# Patient Record
Sex: MALE | Race: OTHER | Employment: STUDENT | URBAN - METROPOLITAN AREA
[De-identification: names, ages, dates, MRNs, and addresses within clinical notes are randomized per-mention and may not be internally consistent; named-entity substitution may affect disease eponyms.]

---
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
## 2022-09-21 ENCOUNTER — OFFICE VISIT (OUTPATIENT)
Dept: FAMILY MEDICINE CLINIC | Facility: CLINIC | Age: 22
End: 2022-09-21

## 2022-09-21 VITALS
BODY MASS INDEX: 22.62 KG/M2 | SYSTOLIC BLOOD PRESSURE: 129 MMHG | HEIGHT: 70 IN | WEIGHT: 158 LBS | RESPIRATION RATE: 18 BRPM | TEMPERATURE: 98 F | HEART RATE: 76 BPM | DIASTOLIC BLOOD PRESSURE: 70 MMHG | OXYGEN SATURATION: 99 %

## 2022-09-21 DIAGNOSIS — K64.9 HEMORRHOIDS, UNSPECIFIED HEMORRHOID TYPE: Primary | ICD-10-CM

## 2022-09-21 PROCEDURE — 3074F SYST BP LT 130 MM HG: CPT | Performed by: NURSE PRACTITIONER

## 2022-09-21 PROCEDURE — 3078F DIAST BP <80 MM HG: CPT | Performed by: NURSE PRACTITIONER

## 2022-09-21 PROCEDURE — 99202 OFFICE O/P NEW SF 15 MIN: CPT | Performed by: NURSE PRACTITIONER

## 2022-09-21 PROCEDURE — 3008F BODY MASS INDEX DOCD: CPT | Performed by: NURSE PRACTITIONER

## 2023-10-11 ENCOUNTER — OFFICE VISIT (OUTPATIENT)
Dept: FAMILY MEDICINE CLINIC | Facility: CLINIC | Age: 23
End: 2023-10-11
Payer: MEDICAID

## 2023-10-11 VITALS
TEMPERATURE: 98 F | SYSTOLIC BLOOD PRESSURE: 118 MMHG | HEIGHT: 70 IN | RESPIRATION RATE: 16 BRPM | HEART RATE: 80 BPM | OXYGEN SATURATION: 96 % | BODY MASS INDEX: 24.05 KG/M2 | WEIGHT: 168 LBS | DIASTOLIC BLOOD PRESSURE: 60 MMHG

## 2023-10-11 DIAGNOSIS — R10.10 UPPER ABDOMINAL PAIN: Primary | ICD-10-CM

## 2023-10-11 PROCEDURE — 99214 OFFICE O/P EST MOD 30 MIN: CPT | Performed by: NURSE PRACTITIONER

## 2023-10-11 PROCEDURE — 3078F DIAST BP <80 MM HG: CPT | Performed by: NURSE PRACTITIONER

## 2023-10-11 PROCEDURE — 3008F BODY MASS INDEX DOCD: CPT | Performed by: NURSE PRACTITIONER

## 2023-10-11 PROCEDURE — 3074F SYST BP LT 130 MM HG: CPT | Performed by: NURSE PRACTITIONER

## 2023-10-12 ENCOUNTER — OFFICE VISIT (OUTPATIENT)
Dept: INTERNAL MEDICINE CLINIC | Facility: CLINIC | Age: 23
End: 2023-10-12

## 2023-10-12 ENCOUNTER — LAB ENCOUNTER (OUTPATIENT)
Dept: LAB | Age: 23
End: 2023-10-12
Attending: NURSE PRACTITIONER
Payer: MEDICAID

## 2023-10-12 VITALS
SYSTOLIC BLOOD PRESSURE: 120 MMHG | WEIGHT: 166 LBS | HEIGHT: 70 IN | BODY MASS INDEX: 23.77 KG/M2 | TEMPERATURE: 98 F | HEART RATE: 95 BPM | DIASTOLIC BLOOD PRESSURE: 60 MMHG | OXYGEN SATURATION: 98 %

## 2023-10-12 DIAGNOSIS — R06.02 SHORTNESS OF BREATH: ICD-10-CM

## 2023-10-12 DIAGNOSIS — K64.0 GRADE I HEMORRHOIDS: ICD-10-CM

## 2023-10-12 DIAGNOSIS — K59.1 FUNCTIONAL DIARRHEA: ICD-10-CM

## 2023-10-12 DIAGNOSIS — R53.82 CHRONIC FATIGUE: ICD-10-CM

## 2023-10-12 DIAGNOSIS — R10.10 PAIN OF UPPER ABDOMEN: ICD-10-CM

## 2023-10-12 DIAGNOSIS — R10.10 PAIN OF UPPER ABDOMEN: Primary | ICD-10-CM

## 2023-10-12 DIAGNOSIS — R10.84 GENERALIZED ABDOMINAL PAIN: ICD-10-CM

## 2023-10-12 PROBLEM — K64.9 HEMORRHOIDS: Status: ACTIVE | Noted: 2023-10-12

## 2023-10-12 LAB
ALBUMIN SERPL-MCNC: 4.5 G/DL (ref 3.4–5)
ALBUMIN/GLOB SERPL: 1.5 {RATIO} (ref 1–2)
ALP LIVER SERPL-CCNC: 80 U/L
ALT SERPL-CCNC: 53 U/L
ANION GAP SERPL CALC-SCNC: 7 MMOL/L (ref 0–18)
AST SERPL-CCNC: 23 U/L (ref 15–37)
BILIRUB SERPL-MCNC: 0.9 MG/DL (ref 0.1–2)
BUN BLD-MCNC: 8 MG/DL (ref 7–18)
BUN/CREAT SERPL: 7.1 (ref 10–20)
CALCIUM BLD-MCNC: 9.7 MG/DL (ref 8.5–10.1)
CHLORIDE SERPL-SCNC: 106 MMOL/L (ref 98–112)
CHOLEST SERPL-MCNC: 147 MG/DL (ref ?–200)
CO2 SERPL-SCNC: 27 MMOL/L (ref 21–32)
CREAT BLD-MCNC: 1.12 MG/DL
DEPRECATED RDW RBC AUTO: 37.8 FL (ref 35.1–46.3)
EGFRCR SERPLBLD CKD-EPI 2021: 96 ML/MIN/1.73M2 (ref 60–?)
ERYTHROCYTE [DISTWIDTH] IN BLOOD BY AUTOMATED COUNT: 11.9 % (ref 11–15)
FASTING PATIENT LIPID ANSWER: YES
FASTING STATUS PATIENT QL REPORTED: YES
GLOBULIN PLAS-MCNC: 3.1 G/DL (ref 2.8–4.4)
GLUCOSE BLD-MCNC: 123 MG/DL (ref 70–99)
HCT VFR BLD AUTO: 48.8 %
HDLC SERPL-MCNC: 60 MG/DL (ref 40–59)
HGB BLD-MCNC: 16.6 G/DL
LDLC SERPL CALC-MCNC: 77 MG/DL (ref ?–100)
MCH RBC QN AUTO: 29.3 PG (ref 26–34)
MCHC RBC AUTO-ENTMCNC: 34 G/DL (ref 31–37)
MCV RBC AUTO: 86.1 FL
NONHDLC SERPL-MCNC: 87 MG/DL (ref ?–130)
OSMOLALITY SERPL CALC.SUM OF ELEC: 290 MOSM/KG (ref 275–295)
PLATELET # BLD AUTO: 232 10(3)UL (ref 150–450)
POTASSIUM SERPL-SCNC: 3.8 MMOL/L (ref 3.5–5.1)
PROT SERPL-MCNC: 7.6 G/DL (ref 6.4–8.2)
RBC # BLD AUTO: 5.67 X10(6)UL
SODIUM SERPL-SCNC: 140 MMOL/L (ref 136–145)
TRIGL SERPL-MCNC: 46 MG/DL (ref 30–149)
TSI SER-ACNC: 0.8 MIU/ML (ref 0.36–3.74)
VLDLC SERPL CALC-MCNC: 7 MG/DL (ref 0–30)
WBC # BLD AUTO: 5.2 X10(3) UL (ref 4–11)

## 2023-10-12 PROCEDURE — 3008F BODY MASS INDEX DOCD: CPT | Performed by: NURSE PRACTITIONER

## 2023-10-12 PROCEDURE — 84443 ASSAY THYROID STIM HORMONE: CPT

## 2023-10-12 PROCEDURE — 3078F DIAST BP <80 MM HG: CPT | Performed by: NURSE PRACTITIONER

## 2023-10-12 PROCEDURE — 3074F SYST BP LT 130 MM HG: CPT | Performed by: NURSE PRACTITIONER

## 2023-10-12 PROCEDURE — 87338 HPYLORI STOOL AG IA: CPT

## 2023-10-12 PROCEDURE — 81003 URINALYSIS AUTO W/O SCOPE: CPT

## 2023-10-12 PROCEDURE — 85027 COMPLETE CBC AUTOMATED: CPT

## 2023-10-12 PROCEDURE — 99205 OFFICE O/P NEW HI 60 MIN: CPT | Performed by: NURSE PRACTITIONER

## 2023-10-12 PROCEDURE — 80061 LIPID PANEL: CPT

## 2023-10-12 PROCEDURE — 80053 COMPREHEN METABOLIC PANEL: CPT

## 2023-10-12 PROCEDURE — 36415 COLL VENOUS BLD VENIPUNCTURE: CPT

## 2023-10-12 NOTE — ASSESSMENT & PLAN NOTE
Patient complains of shortness of breath at times. No shortness of breath this visit. No edema. Lungs are clear.     Plan  Monitor  Annual labs  Encouraged to seek employment

## 2023-10-12 NOTE — ASSESSMENT & PLAN NOTE
Patient with generalized abdominal pain. Unremarkable abdominal exam.  Patient denies any pain with palpation. Bowel sounds in all 4 quadrants. Patient states the pain occurs with movement which would lead me to believe it is more muscle skeletal pain. At times he says he has a burning pain in his epigastric area. Plan  Stool for H. Pylori. Following a balanced diet.

## 2023-10-12 NOTE — ASSESSMENT & PLAN NOTE
Patient alternates between constipation and diarrhea. Plan  Discussed lifestyle modifications including reductions in dietary total and saturated fat, weight loss, aerobic exercise, and eating a diet rich in fruits and vegetables. Reduce bread, pasta and rice in your diet.     Having a cup of coffee a day- No cream or sugar

## 2023-10-13 ENCOUNTER — LAB ENCOUNTER (OUTPATIENT)
Dept: LAB | Age: 23
End: 2023-10-13
Attending: NURSE PRACTITIONER
Payer: MEDICAID

## 2023-10-13 DIAGNOSIS — R10.10 PAIN OF UPPER ABDOMEN: ICD-10-CM

## 2023-10-13 LAB
BILIRUB UR QL: NEGATIVE
CLARITY UR: CLEAR
COLOR UR: COLORLESS
GLUCOSE UR-MCNC: NORMAL MG/DL
HGB UR QL STRIP.AUTO: NEGATIVE
KETONES UR-MCNC: NEGATIVE MG/DL
LEUKOCYTE ESTERASE UR QL STRIP.AUTO: NEGATIVE
NITRITE UR QL STRIP.AUTO: NEGATIVE
PH UR: 5 [PH] (ref 5–8)
PROT UR-MCNC: NEGATIVE MG/DL
SP GR UR STRIP: <1.005 (ref 1–1.03)
UROBILINOGEN UR STRIP-ACNC: NORMAL

## 2023-10-16 ENCOUNTER — TELEPHONE (OUTPATIENT)
Dept: INTERNAL MEDICINE CLINIC | Facility: CLINIC | Age: 23
End: 2023-10-16

## 2023-10-16 LAB — H PYLORI AG STL QL IA: NEGATIVE

## 2023-10-16 NOTE — TELEPHONE ENCOUNTER
Spoke to patient (name and  of patient verified). He states he is returning call about test results. Provider's results and recommendations reviewed with patient, patient verbalizes understanding. Patient reports he had sharp abdominal pain this afternoon that lasted less that 1 minute. He ate some beans and meat from [employer] about 2-3 hours beforehand and states the food was not spicy. Patient states he is anxious about what may be causing his symptoms. He requests appointment to discuss concerns with provider. Appointment scheduled:  Future Appointments   Date Time Provider Ayse Cardenas   10/17/2023  3:40 PM ZEINA [facility]   2023  4:00 PM ZEINA De Santiago [facility]     Result note from H. Pylori test collected 10/12/23:   Charlie Adorno MD  10/16/2023  4:03 PM CDT Back to Top      Please call patient with results. Charlie Adorno MD  10/16/2023  2:84 PM CDT       Helicobacter is negative , this is the bacteria which causes heartburn.

## 2023-10-30 ENCOUNTER — OFFICE VISIT (OUTPATIENT)
Dept: INTERNAL MEDICINE CLINIC | Facility: CLINIC | Age: 23
End: 2023-10-30

## 2023-10-30 VITALS
BODY MASS INDEX: 23.34 KG/M2 | SYSTOLIC BLOOD PRESSURE: 115 MMHG | HEIGHT: 70 IN | HEART RATE: 92 BPM | DIASTOLIC BLOOD PRESSURE: 60 MMHG | WEIGHT: 163 LBS

## 2023-10-30 DIAGNOSIS — K59.1 FUNCTIONAL DIARRHEA: ICD-10-CM

## 2023-10-30 DIAGNOSIS — R73.9 HYPERGLYCEMIA: Primary | ICD-10-CM

## 2023-10-30 DIAGNOSIS — R53.82 CHRONIC FATIGUE: ICD-10-CM

## 2023-10-30 DIAGNOSIS — R10.84 GENERALIZED ABDOMINAL PAIN: ICD-10-CM

## 2023-10-30 DIAGNOSIS — R06.02 SHORTNESS OF BREATH: ICD-10-CM

## 2023-10-30 LAB
CARTRIDGE LOT#: 612 NUMERIC
HEMOGLOBIN A1C: 4.9 % (ref 4.3–5.6)

## 2023-10-30 PROCEDURE — 83036 HEMOGLOBIN GLYCOSYLATED A1C: CPT | Performed by: NURSE PRACTITIONER

## 2023-10-30 PROCEDURE — 3078F DIAST BP <80 MM HG: CPT | Performed by: NURSE PRACTITIONER

## 2023-10-30 PROCEDURE — 99214 OFFICE O/P EST MOD 30 MIN: CPT | Performed by: NURSE PRACTITIONER

## 2023-10-30 PROCEDURE — 3008F BODY MASS INDEX DOCD: CPT | Performed by: NURSE PRACTITIONER

## 2023-10-30 PROCEDURE — 3074F SYST BP LT 130 MM HG: CPT | Performed by: NURSE PRACTITIONER

## 2023-10-30 NOTE — ASSESSMENT & PLAN NOTE
Patient states he is SOB at times. No dyspnea noted.   Lungs clear    Plan  Patient assured that all his labs are WNL

## 2023-10-30 NOTE — ASSESSMENT & PLAN NOTE
Fatigue - Most likely from the magnesium he has been taking for sleep. Plan  Stop this supplement of magnesium.   Follow up with [facility]

## 2023-11-07 PROBLEM — R10.84 GENERALIZED ABDOMINAL PAIN: Status: RESOLVED | Noted: 2023-10-12 | Resolved: 2023-11-07

## 2023-11-07 PROBLEM — R06.02 SHORTNESS OF BREATH: Status: RESOLVED | Noted: 2023-10-12 | Resolved: 2023-11-07

## 2023-11-07 PROBLEM — F41.1 GENERALIZED ANXIETY DISORDER: Status: ACTIVE | Noted: 2023-11-07

## 2023-11-07 PROBLEM — F32.0 CURRENT MILD EPISODE OF MAJOR DEPRESSIVE DISORDER (HCC): Status: ACTIVE | Noted: 2023-11-07

## 2023-11-07 PROBLEM — R53.82 CHRONIC FATIGUE: Status: RESOLVED | Noted: 2023-10-12 | Resolved: 2023-11-07

## 2023-11-07 PROBLEM — F32.0 CURRENT MILD EPISODE OF MAJOR DEPRESSIVE DISORDER: Status: ACTIVE | Noted: 2023-11-07

## 2023-11-07 PROBLEM — R19.5 LOOSE STOOLS: Status: ACTIVE | Noted: 2023-10-12

## 2023-11-07 PROBLEM — R25.3 MUSCLE TWITCHING: Status: ACTIVE | Noted: 2023-11-07

## 2023-11-07 PROBLEM — G44.89 OTHER HEADACHE SYNDROME: Status: ACTIVE | Noted: 2023-11-07

## 2024-02-09 ENCOUNTER — TELEPHONE (OUTPATIENT)
Dept: NEUROLOGY | Facility: CLINIC | Age: 24
End: 2024-02-09

## 2024-02-09 NOTE — TELEPHONE ENCOUNTER
FABIENNE. Pt had appt on March 28, provider won't be in office. If pt calls back please assist in r/s.

## 2024-11-08 ENCOUNTER — TELEPHONE (OUTPATIENT)
Dept: INTERNAL MEDICINE CLINIC | Facility: CLINIC | Age: 24
End: 2024-11-08

## 2024-11-08 NOTE — TELEPHONE ENCOUNTER
Patient booked via [employer]        Message    Appointment for: Tong Quan ([id number])   Visit type: MYCHART EXAM (2964)   11/11/2024 3:40 PM (20 minutes) with Netta Dorado in [facility]-INTERNAL MED      Patient comments:   problems breathing and phlegm

## 2024-11-12 ENCOUNTER — PATIENT MESSAGE (OUTPATIENT)
Dept: ADMINISTRATIVE | Age: 24
End: 2024-11-12

## 2024-11-13 ENCOUNTER — HOSPITAL ENCOUNTER (EMERGENCY)
Facility: HOSPITAL | Age: 24
Discharge: HOME | End: 2024-11-13

## 2024-11-13 ENCOUNTER — APPOINTMENT (OUTPATIENT)
Dept: GENERAL RADIOLOGY | Facility: HOSPITAL | Age: 24
End: 2024-11-13
Attending: NURSE PRACTITIONER

## 2024-11-13 VITALS
RESPIRATION RATE: 16 BRPM | DIASTOLIC BLOOD PRESSURE: 82 MMHG | TEMPERATURE: 98 F | BODY MASS INDEX: 27 KG/M2 | OXYGEN SATURATION: 98 % | SYSTOLIC BLOOD PRESSURE: 118 MMHG | HEART RATE: 67 BPM | WEIGHT: 190 LBS

## 2024-11-13 DIAGNOSIS — R07.9 ACUTE CHEST PAIN: Primary | ICD-10-CM

## 2024-11-13 LAB
ANION GAP SERPL CALC-SCNC: 6 MMOL/L (ref 0–18)
ATRIAL RATE: 87 BPM
BASOPHILS # BLD AUTO: 0.05 X10(3) UL (ref 0–0.2)
BASOPHILS NFR BLD AUTO: 0.7 %
BUN BLD-MCNC: 9 MG/DL (ref 9–23)
BUN/CREAT SERPL: 8.4 (ref 10–20)
CALCIUM BLD-MCNC: 10.8 MG/DL (ref 8.7–10.4)
CHLORIDE SERPL-SCNC: 106 MMOL/L (ref 98–112)
CO2 SERPL-SCNC: 30 MMOL/L (ref 21–32)
CREAT BLD-MCNC: 1.07 MG/DL
DEPRECATED RDW RBC AUTO: 35.8 FL (ref 35.1–46.3)
EGFRCR SERPLBLD CKD-EPI 2021: 101 ML/MIN/1.73M2 (ref 60–?)
EOSINOPHIL # BLD AUTO: 0.08 X10(3) UL (ref 0–0.7)
EOSINOPHIL NFR BLD AUTO: 1.1 %
ERYTHROCYTE [DISTWIDTH] IN BLOOD BY AUTOMATED COUNT: 11.9 % (ref 11–15)
GLUCOSE BLD-MCNC: 98 MG/DL (ref 70–99)
HCT VFR BLD AUTO: 49.4 %
HGB BLD-MCNC: 16.8 G/DL
IMM GRANULOCYTES # BLD AUTO: 0.02 X10(3) UL (ref 0–1)
IMM GRANULOCYTES NFR BLD: 0.3 %
LYMPHOCYTES # BLD AUTO: 1.75 X10(3) UL (ref 1–4)
LYMPHOCYTES NFR BLD AUTO: 24.6 %
MCH RBC QN AUTO: 28.6 PG (ref 26–34)
MCHC RBC AUTO-ENTMCNC: 34 G/DL (ref 31–37)
MCV RBC AUTO: 84.2 FL
MONOCYTES # BLD AUTO: 0.53 X10(3) UL (ref 0.1–1)
MONOCYTES NFR BLD AUTO: 7.5 %
NEUTROPHILS # BLD AUTO: 4.67 X10 (3) UL (ref 1.5–7.7)
NEUTROPHILS # BLD AUTO: 4.67 X10(3) UL (ref 1.5–7.7)
NEUTROPHILS NFR BLD AUTO: 65.8 %
OSMOLALITY SERPL CALC.SUM OF ELEC: 293 MOSM/KG (ref 275–295)
P AXIS: 58 DEGREES
P-R INTERVAL: 140 MS
PLATELET # BLD AUTO: 245 10(3)UL (ref 150–450)
POTASSIUM SERPL-SCNC: 4 MMOL/L (ref 3.5–5.1)
Q-T INTERVAL: 360 MS
QRS DURATION: 108 MS
QTC CALCULATION (BEZET): 433 MS
R AXIS: -32 DEGREES
RBC # BLD AUTO: 5.87 X10(6)UL
SODIUM SERPL-SCNC: 142 MMOL/L (ref 136–145)
T AXIS: 53 DEGREES
TROPONIN I SERPL HS-MCNC: <3 NG/L
VENTRICULAR RATE: 87 BPM
WBC # BLD AUTO: 7.1 X10(3) UL (ref 4–11)

## 2024-11-13 PROCEDURE — 84484 ASSAY OF TROPONIN QUANT: CPT | Performed by: NURSE PRACTITIONER

## 2024-11-13 PROCEDURE — 36415 COLL VENOUS BLD VENIPUNCTURE: CPT

## 2024-11-13 PROCEDURE — 80048 BASIC METABOLIC PNL TOTAL CA: CPT | Performed by: NURSE PRACTITIONER

## 2024-11-13 PROCEDURE — 99284 EMERGENCY DEPT VISIT MOD MDM: CPT

## 2024-11-13 PROCEDURE — 71045 X-RAY EXAM CHEST 1 VIEW: CPT | Performed by: NURSE PRACTITIONER

## 2024-11-13 PROCEDURE — 93010 ELECTROCARDIOGRAM REPORT: CPT

## 2024-11-13 PROCEDURE — 85025 COMPLETE CBC W/AUTO DIFF WBC: CPT | Performed by: NURSE PRACTITIONER

## 2024-11-13 PROCEDURE — 93005 ELECTROCARDIOGRAM TRACING: CPT

## 2024-11-13 NOTE — ED PROVIDER NOTES
Patient Seen in: [facility] Emergency Department      History     Chief Complaint   Patient presents with    Chest Pain Angina     Stated Complaint: Chest Pain    Subjective:   23yo/m w no chronic medical problems reports with chest pain and dyspnea. Reports \"my chest feels like it is burning\". X 2-3 days. No recent illness. No leg swelling. No vomiting. NO focal deficits. No weakness. No back or abd pain. No flank pain.               Objective:     History reviewed. No pertinent past medical history.           History reviewed. No pertinent surgical history.             Social History     Socioeconomic History    Marital status: Single   Tobacco Use    Smoking status: Former     Current packs/day: 0.00     Average packs/day: 0.1 packs/day for 3.0 years (0.2 ttl pk-yrs)     Types: Cigarettes     Start date: 2015     Quit date: 2018     Years since quittin.4    Smokeless tobacco: Never   Vaping Use    Vaping status: Former    Substances: Nicotine, THC   Substance and Sexual Activity    Alcohol use: Yes     Comment: Occasional - every 3 weeks    Drug use: Not Currently     Types: Cannabis    Sexual activity: Never     Social Drivers of Health      [facility]    Social Connections    [facility]    Housing Stability                  Physical Exam     ED Triage Vitals [24 1244]   /83   Pulse 94   Resp 18   Temp 97.7 °F (36.5 °C)   Temp src Temporal   SpO2 98 %   O2 Device None (Room air)       Current Vitals:   Vital Signs  BP: 118/80  Pulse: 75  Resp: 16  Temp: 97.7 °F (36.5 °C)  Temp src: Temporal  MAP (mmHg): 92    Oxygen Therapy  SpO2: 98 %  O2 Device: None (Room air)        Physical Exam  Vitals and nursing note reviewed.   Constitutional:       General: He is not in acute distress.     Appearance: He is well-developed.   HENT:      Head: Normocephalic  and atraumatic.      Nose: Nose normal.      Mouth/Throat:      Mouth: Mucous membranes are moist.   Eyes:      Conjunctiva/sclera: Conjunctivae normal.      Pupils: Pupils are equal, round, and reactive to light.   Cardiovascular:      Rate and Rhythm: Normal rate and regular rhythm.      Heart sounds: Normal heart sounds.   Pulmonary:      Effort: Pulmonary effort is normal.      Breath sounds: Normal breath sounds.   Abdominal:      General: Bowel sounds are normal.      Palpations: Abdomen is soft.   Musculoskeletal:         General: No tenderness or deformity. Normal range of motion.      Cervical back: Normal range of motion and neck supple.   Skin:     General: Skin is warm and dry.      Capillary Refill: Capillary refill takes less than 2 seconds.      Findings: No rash.      Comments: Normal color   Neurological:      General: No focal deficit present.      Mental Status: He is alert and oriented to person, place, and time.      GCS: GCS eye subscore is 4. GCS verbal subscore is 5. GCS motor subscore is 6.      Cranial Nerves: No cranial nerve deficit.      Gait: Gait normal.             ED Course     Labs Reviewed   CBC WITH DIFFERENTIAL WITH PLATELET - Abnormal; Notable for the following components:       Result Value    RBC 5.87 (*)     All other components within normal limits   BASIC METABOLIC PANEL (8) - Abnormal; Notable for the following components:    BUN/CREA Ratio 8.4 (*)     Calcium, Total 10.8 (*)     All other components within normal limits   TROPONIN I HIGH SENSITIVITY - Normal     EKG    Rate, intervals and axes as noted on EKG Report.  Rate: 87   Rhythm: Sinus Rhythm  Reading: NSR no maddi no ectopy normal axis  Stable clinical condition  No comparison                  TECHNIQUE:   Single view.       FINDINGS:  CARDIAC/VASC: Normal.  No cardiac silhouette abnormality or cardiomegaly.  Unremarkable pulmonary vasculature.  MEDIAST/[place]:   No visible mass or adenopathy.  LUNGS/PLEURA: Normal.   No significant pulmonary parenchymal abnormalities.  No effusion or pleural thickening.  BONES: No fracture or visible bony lesion.  OTHER: Negative.                Impression  CONCLUSION: Normal examination.             Dictated by (CST): Gustavo Mazariegos MD on 11/13/2024 at 2:10 PM      Finalized by (CST): Gustavo Mazariegos MD on 11/13/2024 at 2:10 PM             MDM              Medical Decision Making  23yo/m w hx and exam as stated; chest pain    Lungs ctab  Normotensive  No fever  No vomiting  No dizziness  Cxr wnl  EKG non acute  Trop neg  Perc neg  Heart score 1    Plan  Dc to home      Amount and/or Complexity of Data Reviewed  Labs:  Decision-making details documented in ED Course.  Radiology:  Decision-making details documented in ED Course.  ECG/medicine tests:  Decision-making details documented in ED Course.    Risk  OTC drugs.  Prescription drug management.        Disposition and Plan     Clinical Impression:  1. Acute chest pain         Disposition:  Discharge  11/13/2024  2:25 pm    Follow-up:  Juan Barrera, DO  [address] [facility]  [address] [place] [address]  [phone]    Follow up in 2 day(s)            Medications Prescribed:  There are no discharge medications for this patient.          Supplementary Documentation:

## 2024-11-13 NOTE — TELEPHONE ENCOUNTER
Patient returned our call ( Identified name and date of birth )  had to cancel appointment    And  remains with chest discomfort, scratchy throat, mucus  in his throat     He is states he no longer has insurance      Explained to the patient he can have a self pay visit OR go to [facility]     Patient verbalizes understanding, he does not want to make an  appointment    this time

## 2024-11-16 ENCOUNTER — HOSPITAL ENCOUNTER (EMERGENCY)
Facility: HOSPITAL | Age: 24
Discharge: HOME | End: 2024-11-16
Attending: EMERGENCY MEDICINE

## 2024-11-16 ENCOUNTER — APPOINTMENT (OUTPATIENT)
Dept: GENERAL RADIOLOGY | Facility: HOSPITAL | Age: 24
End: 2024-11-16
Attending: EMERGENCY MEDICINE

## 2024-11-16 ENCOUNTER — APPOINTMENT (OUTPATIENT)
Dept: ULTRASOUND IMAGING | Facility: HOSPITAL | Age: 24
End: 2024-11-16
Attending: EMERGENCY MEDICINE

## 2024-11-16 VITALS
DIASTOLIC BLOOD PRESSURE: 84 MMHG | WEIGHT: 190 LBS | TEMPERATURE: 98 F | RESPIRATION RATE: 18 BRPM | SYSTOLIC BLOOD PRESSURE: 128 MMHG | BODY MASS INDEX: 27 KG/M2 | OXYGEN SATURATION: 99 % | HEART RATE: 68 BPM

## 2024-11-16 DIAGNOSIS — R10.10 UPPER ABDOMINAL PAIN: Primary | ICD-10-CM

## 2024-11-16 LAB
ALBUMIN SERPL-MCNC: 4.8 G/DL (ref 3.2–4.8)
ALBUMIN/GLOB SERPL: 1.9 {RATIO} (ref 1–2)
ALP LIVER SERPL-CCNC: 93 U/L
ALT SERPL-CCNC: 59 U/L
ANION GAP SERPL CALC-SCNC: 8 MMOL/L (ref 0–18)
AST SERPL-CCNC: 30 U/L (ref ?–34)
BASOPHILS # BLD AUTO: 0.04 X10(3) UL (ref 0–0.2)
BASOPHILS NFR BLD AUTO: 0.7 %
BILIRUB SERPL-MCNC: 0.8 MG/DL (ref 0.3–1.2)
BUN BLD-MCNC: 10 MG/DL (ref 9–23)
BUN/CREAT SERPL: 8.8 (ref 10–20)
CALCIUM BLD-MCNC: 10 MG/DL (ref 8.7–10.4)
CHLORIDE SERPL-SCNC: 106 MMOL/L (ref 98–112)
CO2 SERPL-SCNC: 27 MMOL/L (ref 21–32)
CREAT BLD-MCNC: 1.14 MG/DL
DEPRECATED RDW RBC AUTO: 35.7 FL (ref 35.1–46.3)
EGFRCR SERPLBLD CKD-EPI 2021: 93 ML/MIN/1.73M2 (ref 60–?)
EOSINOPHIL # BLD AUTO: 0.08 X10(3) UL (ref 0–0.7)
EOSINOPHIL NFR BLD AUTO: 1.4 %
ERYTHROCYTE [DISTWIDTH] IN BLOOD BY AUTOMATED COUNT: 11.7 % (ref 11–15)
GLOBULIN PLAS-MCNC: 2.5 G/DL (ref 2–3.5)
GLUCOSE BLD-MCNC: 105 MG/DL (ref 70–99)
HCT VFR BLD AUTO: 46.2 %
HGB BLD-MCNC: 16 G/DL
IMM GRANULOCYTES # BLD AUTO: 0.01 X10(3) UL (ref 0–1)
IMM GRANULOCYTES NFR BLD: 0.2 %
LIPASE SERPL-CCNC: 49 U/L (ref 12–53)
LYMPHOCYTES # BLD AUTO: 1.93 X10(3) UL (ref 1–4)
LYMPHOCYTES NFR BLD AUTO: 34.5 %
MCH RBC QN AUTO: 28.9 PG (ref 26–34)
MCHC RBC AUTO-ENTMCNC: 34.6 G/DL (ref 31–37)
MCV RBC AUTO: 83.5 FL
MONOCYTES # BLD AUTO: 0.47 X10(3) UL (ref 0.1–1)
MONOCYTES NFR BLD AUTO: 8.4 %
NEUTROPHILS # BLD AUTO: 3.06 X10 (3) UL (ref 1.5–7.7)
NEUTROPHILS # BLD AUTO: 3.06 X10(3) UL (ref 1.5–7.7)
NEUTROPHILS NFR BLD AUTO: 54.8 %
OSMOLALITY SERPL CALC.SUM OF ELEC: 291 MOSM/KG (ref 275–295)
PLATELET # BLD AUTO: 238 10(3)UL (ref 150–450)
POTASSIUM SERPL-SCNC: 3.6 MMOL/L (ref 3.5–5.1)
PROT SERPL-MCNC: 7.3 G/DL (ref 5.7–8.2)
RBC # BLD AUTO: 5.53 X10(6)UL
SODIUM SERPL-SCNC: 141 MMOL/L (ref 136–145)
WBC # BLD AUTO: 5.6 X10(3) UL (ref 4–11)

## 2024-11-16 PROCEDURE — 85025 COMPLETE CBC W/AUTO DIFF WBC: CPT | Performed by: EMERGENCY MEDICINE

## 2024-11-16 PROCEDURE — 99285 EMERGENCY DEPT VISIT HI MDM: CPT

## 2024-11-16 PROCEDURE — 96374 THER/PROPH/DIAG INJ IV PUSH: CPT

## 2024-11-16 PROCEDURE — 83690 ASSAY OF LIPASE: CPT | Performed by: EMERGENCY MEDICINE

## 2024-11-16 PROCEDURE — 74018 RADEX ABDOMEN 1 VIEW: CPT | Performed by: EMERGENCY MEDICINE

## 2024-11-16 PROCEDURE — S0028 INJECTION, FAMOTIDINE, 20 MG: HCPCS | Performed by: EMERGENCY MEDICINE

## 2024-11-16 PROCEDURE — 99284 EMERGENCY DEPT VISIT MOD MDM: CPT

## 2024-11-16 PROCEDURE — 76705 ECHO EXAM OF ABDOMEN: CPT | Performed by: EMERGENCY MEDICINE

## 2024-11-16 PROCEDURE — 80053 COMPREHEN METABOLIC PANEL: CPT | Performed by: EMERGENCY MEDICINE

## 2024-11-16 RX ORDER — FAMOTIDINE 10 MG/ML
20 INJECTION, SOLUTION INTRAVENOUS ONCE
Status: COMPLETED | OUTPATIENT
Start: 2024-11-16 | End: 2024-11-16

## 2024-11-16 RX ORDER — OMEPRAZOLE 40 MG/1
40 CAPSULE, DELAYED RELEASE ORAL DAILY
Qty: 30 CAPSULE | Refills: 0 | Status: SHIPPED | OUTPATIENT
Start: 2024-11-16 | End: 2024-12-16

## 2024-11-16 RX ORDER — SUCRALFATE ORAL 1 G/10ML
1 SUSPENSION ORAL
Qty: 560 ML | Refills: 0 | Status: SHIPPED | OUTPATIENT
Start: 2024-11-16 | End: 2024-11-30

## 2024-11-16 NOTE — ED INITIAL ASSESSMENT (HPI)
RUQ pain x 8 mo's, has been seen for this before but hasn't had any imaging. Here today per pain more severe

## 2024-11-16 NOTE — ED PROVIDER NOTES
Patient Seen in: [facility] Emergency Department      History     Chief Complaint   Patient presents with    Abdomen/Flank Pain     Stated Complaint: abd pain, diarrhea    Subjective:   HPI      Patient presents the emergency department complaining of epigastric pain and diarrhea.  He states that for the last 2 days has had burning in his upper abdomen.  A few days ago he was seen with burning in his chest and was told that his heart was okay.  He now feels like the pain is more in his upper abdomen.  He denies vomiting.  There is no fever.  There is no lower abdominal pain.  There is no cough or cold symptoms.  There is been no blood in the stool.    Objective:     History reviewed. No pertinent past medical history.           History reviewed. No pertinent surgical history.             Social History     Socioeconomic History    Marital status: Single   Tobacco Use    Smoking status: Former     Current packs/day: 0.00     Average packs/day: 0.1 packs/day for 3.0 years (0.2 ttl pk-yrs)     Types: Cigarettes     Start date: 2015     Quit date: 2018     Years since quittin.4    Smokeless tobacco: Never   Vaping Use    Vaping status: Former    Substances: Nicotine, THC   Substance and Sexual Activity    Alcohol use: Yes     Comment: Occasional - every 3 weeks    Drug use: Not Currently     Types: Cannabis    Sexual activity: Never     Social Drivers of Health      [facility]    Social Connections    [facility]    Housing Stability                  Physical Exam     ED Triage Vitals [24 1239]   /88   Pulse 86   Resp 18   Temp 97.8 °F (36.6 °C)   Temp src Temporal   SpO2 99 %   O2 Device None (Room air)       Current Vitals:   Vital Signs  BP: 132/84  Pulse: 67  Resp: 17  Temp: 97.8 °F (36.6 °C)  Temp src: Temporal    Oxygen Therapy  SpO2: 99 %  O2 Device:  None (Room air)        Physical Exam  Vitals and nursing note reviewed.   Constitutional:       General: He is not in acute distress.     Appearance: He is well-developed.   HENT:      Head: Normocephalic.      Nose: Nose normal.      Mouth/Throat:      Mouth: Mucous membranes are moist.   Eyes:      Conjunctiva/sclera: Conjunctivae normal.   Cardiovascular:      Rate and Rhythm: Normal rate and regular rhythm.      Heart sounds: No murmur heard.  Pulmonary:      Effort: Pulmonary effort is normal. No respiratory distress.      Breath sounds: Normal breath sounds.   Abdominal:      General: There is no distension.      Palpations: Abdomen is soft.      Tenderness: There is abdominal tenderness in the epigastric area. There is no guarding or rebound.   Musculoskeletal:         General: No tenderness. Normal range of motion.      Cervical back: Normal range of motion and neck supple.   Skin:     General: Skin is warm and dry.      Capillary Refill: Capillary refill takes less than 2 seconds.      Findings: No rash.   Neurological:      General: No focal deficit present.      Mental Status: He is alert and oriented to person, place, and time.             ED Course     Labs Reviewed   COMP METABOLIC PANEL (14) - Abnormal; Notable for the following components:       Result Value    Glucose 105 (*)     BUN/CREA Ratio 8.8 (*)     ALT 59 (*)     All other components within normal limits   LIPASE - Normal   CBC WITH DIFFERENTIAL WITH PLATELET   RAINBOW DRAW BLUE                   MDM              Medical Decision Making  Differential diagnosis considered for gastritis, pancreatitis, cholelithiasis, cholecystitis.    Problems Addressed:  Upper abdominal pain: acute illness or injury    Amount and/or Complexity of Data Reviewed  Labs: ordered. Decision-making details documented in ED Course.     Details: CBC, chemistry and lipase all negative.  Radiology: ordered and independent interpretation performed. Decision-making  details documented in ED Course.     Details: X-ray shows no evidence of bowel obstruction or constipation.  Ultrasound shows no evidence of cholelithiasis or cholecystitis.  Discussion of management or test interpretation with external provider(s): Patient feels better after medication.  Recommend Prilosec and Carafate and follow-up with GI as likely will need an upper endoscopy.    Risk  Prescription drug management.        Disposition and Plan     Clinical Impression:  1. Upper abdominal pain         Disposition:  Discharge  11/16/2024  3:56 pm    Follow-up:  Jony Cooley MD  [address] [address]  [phone]    Schedule an appointment as soon as possible for a visit            Medications Prescribed:  Current Discharge Medication List        START taking these medications    Details   Omeprazole 40 MG Oral Capsule Delayed Release Take 1 capsule (40 mg total) by mouth daily.  Qty: 30 capsule, Refills: 0      sucralfate 1 GM/10ML Oral Suspension Take 10 mL (1 g total) by mouth 4 (four) times daily before meals and nightly for 14 days.  Qty: 560 mL, Refills: 0                 Supplementary Documentation:

## 2024-12-20 ENCOUNTER — HOSPITAL ENCOUNTER (EMERGENCY)
Facility: HOSPITAL | Age: 24
Discharge: HOME | End: 2024-12-20
Attending: EMERGENCY MEDICINE

## 2024-12-20 VITALS
WEIGHT: 180 LBS | OXYGEN SATURATION: 98 % | SYSTOLIC BLOOD PRESSURE: 104 MMHG | HEIGHT: 69 IN | HEART RATE: 74 BPM | TEMPERATURE: 97 F | RESPIRATION RATE: 18 BRPM | DIASTOLIC BLOOD PRESSURE: 91 MMHG | BODY MASS INDEX: 26.66 KG/M2

## 2024-12-20 DIAGNOSIS — K21.9 GASTROESOPHAGEAL REFLUX DISEASE, UNSPECIFIED WHETHER ESOPHAGITIS PRESENT: Primary | ICD-10-CM

## 2024-12-20 PROCEDURE — 93005 ELECTROCARDIOGRAM TRACING: CPT

## 2024-12-20 PROCEDURE — 99283 EMERGENCY DEPT VISIT LOW MDM: CPT

## 2024-12-20 PROCEDURE — 99284 EMERGENCY DEPT VISIT MOD MDM: CPT

## 2024-12-20 PROCEDURE — 93010 ELECTROCARDIOGRAM REPORT: CPT

## 2024-12-20 RX ORDER — OMEPRAZOLE 40 MG/1
40 CAPSULE, DELAYED RELEASE ORAL DAILY
Qty: 30 CAPSULE | Refills: 0 | Status: SHIPPED | OUTPATIENT
Start: 2024-12-20 | End: 2025-01-19

## 2024-12-21 NOTE — ED INITIAL ASSESSMENT (HPI)
Pt c/o epigastric burning, back pain, and trouble breathing for 2 months, and trouble sleeping for past week  Seen recently for same and sts his s/s improved w/ PO medications given here   No further complaints. A/ox4, respirations unlabored, speech full/clear, gait steady, no acute distress noted.

## 2024-12-21 NOTE — ED PROVIDER NOTES
Patient Seen in: [facility] Emergency Department    History     Chief Complaint   Patient presents with    Abdomen/Flank Pain       HPI    24-year-old male presents to the ED for evaluation of burning discomfort in his lower chest that has been going on for about 1 week.  He states he has also had some intermittent pain in his left upper back between his spine and shoulder blade.  He states he was seen in the ER previously and was given some medicine that has helped his epigastric pain but now he is having symptoms again after completing the medications.    History from Independent Source:       External Records Reviewed: On chart review, patient had chest x-ray, labs, gallbladder ultrasound, completed last month which were all unremarkable.  Patient was subsequently placed on PPI and sucralfate.    History reviewed. No past medical history on file.    History reviewed. No past surgical history on file.      Medications :  Prescriptions Prior to Admission[1]     Family History   Problem Relation Age of Onset    No Known Problems Mother     Diabetes Father     Hypertension Father     Seizure Disorder Sister     No Known Problems Sister     No Known Problems Sister     No Known Problems Sister     No Known Problems Brother     No Known Problems Maternal Grandmother     No Known Problems Maternal Grandfather     No Known Problems Paternal Grandmother     Heart Attack Paternal Grandfather     Colon Cancer Neg     Prostate Cancer Neg     Ulcerative Colitis Neg     Crohn's Disease Neg        Smoking Status:   Social History     Socioeconomic History    Marital status: Single   Tobacco Use    Smoking status: Former     Current packs/day: 0.00     Average packs/day: 0.1 packs/day for 3.0 years (0.2 ttl pk-yrs)     Types: Cigarettes     Start date: 2015     Quit date: 2018     Years since quittin.5    Smokeless tobacco: Never   Vaping Use    Vaping status: Former    Substances: Nicotine, THC   Substance  and Sexual Activity    Alcohol use: Yes     Comment: Occasional - every 3 weeks    Drug use: Not Currently     Types: Cannabis    Sexual activity: Never       Constitutional and vital signs reviewed.      Social History and Family History elements reviewed from today, pertinent positives to the presenting problem noted.    Physical Exam     ED Triage Vitals [12/20/24 2116]   /78   Pulse 79   Resp 16   Temp 96.8 °F (36 °C)   Temp src Temporal   SpO2 99 %   O2 Device None (Room air)       Physical Exam   Constitutional: AAOx3, well nourished, NAD  HEENT: Normocephalic, PERRLA, MMM  CV: s1s2+, RRR, no m/r/g, normal distal pulses  Pulmonary/Chest: CTA b/l with no rales, wheezes.  No chest wall tenderness  Abdominal: Nontender.  Nondistended. Soft. Bowel sounds are normal.   Neck/Back: Mild left upper back tenderness in the region of the rhomboids.  No midline tenderness percussion  :   Musculoskeletal: Normal range of motion. No deformity.   Neurological: Awake, alert. Normal reflexes. No cranial nerve deficit.    Skin: Skin is warm and dry. No rash noted. No erythema.   Psychiatric:      All measures to prevent infection transmission during my interaction with the patient were taken. The patient was already wearing a droplet mask on my arrival to the room. Personal protective equipment was worn throughout the duration of the exam.      ED Course      Labs Reviewed - No data to display  My Independent Interpretation of EKG (if performed): EKG    Rate, intervals and axes as noted on EKG Report.  Rate: 67 bpm  Rhythm: Sinus Rhythm  Reading: Normal sinus rhythm, no acute ST changes, normal axis and intervals, no ectopy             Monitor Interpretation:   normal sinus rhythm as interpreted by me.      Imaging Results Available and Reviewed while in ED: No results found.  ED Medications Administered: Medications - No data to display          MDM     Vitals:    12/20/24 2116   BP: 134/78   Pulse: 79   Resp: 16    Temp: 96.8 °F (36 °C)   TempSrc: Temporal   SpO2: 99%   Weight: 81.6 kg   Height: 175.3 cm (5' 9\")     *I personally reviewed and interpreted all ED vitals.    Independent Interpretation of Studies:     Social Determinants of Health: Patient does not have insurance    Procedures:      Differential/MDM/Shared Decision Making: Differential Diagnosis includes GERD, ACS, pleuritis, gastritis, peptic ulcer disease, others.      The patient already  has no past medical history on file.  to contribute to the complexity of this ED evaluation.           Medications, Diagnostics, or Disposition considered but not done:      Management of case was discussed with patient and I believe he is likely having symptoms of gastritis and reflux.  He also has a mild muscle strain in his rhomboid muscles of his back.  Discussed need for ongoing antacid therapy with patient and he states that he only came to the ED because he does not have insurance and is unable to see a primary care doctor.  Will refill PPI medicine for him at this time.  Patient is comfortable with discharge plan.      Condition upon leaving the department: Stable    Disposition and Plan     Clinical Impression:  1. Gastroesophageal reflux disease, unspecified whether esophagitis present        Disposition:  Discharge    Follow-up:  Ishmael Ann MD  [address] [place] [id number]  [phone]    Call in 2 day(s)        Medications Prescribed:  Current Discharge Medication List                   [1] (Not in a hospital admission)

## 2024-12-23 LAB
ATRIAL RATE: 67 BPM
P AXIS: 64 DEGREES
P-R INTERVAL: 146 MS
Q-T INTERVAL: 366 MS
QRS DURATION: 100 MS
QTC CALCULATION (BEZET): 386 MS
R AXIS: -7 DEGREES
T AXIS: 46 DEGREES
VENTRICULAR RATE: 67 BPM

## 2025-03-02 ENCOUNTER — HOSPITAL ENCOUNTER (EMERGENCY)
Facility: HOSPITAL | Age: 25
Discharge: HOME | End: 2025-03-02
Attending: EMERGENCY MEDICINE

## 2025-03-02 ENCOUNTER — APPOINTMENT (OUTPATIENT)
Dept: GENERAL RADIOLOGY | Facility: HOSPITAL | Age: 25
End: 2025-03-02
Attending: EMERGENCY MEDICINE

## 2025-03-02 VITALS
HEART RATE: 85 BPM | SYSTOLIC BLOOD PRESSURE: 135 MMHG | WEIGHT: 175 LBS | OXYGEN SATURATION: 100 % | RESPIRATION RATE: 20 BRPM | BODY MASS INDEX: 25.05 KG/M2 | DIASTOLIC BLOOD PRESSURE: 79 MMHG | TEMPERATURE: 98 F | HEIGHT: 70 IN

## 2025-03-02 DIAGNOSIS — R07.9 ACUTE CHEST PAIN: Primary | ICD-10-CM

## 2025-03-02 LAB
ALBUMIN SERPL-MCNC: 4.7 G/DL (ref 3.2–4.8)
ALBUMIN/GLOB SERPL: 2 {RATIO} (ref 1–2)
ALP LIVER SERPL-CCNC: 99 U/L
ALT SERPL-CCNC: 35 U/L
ANION GAP SERPL CALC-SCNC: 9 MMOL/L (ref 0–18)
AST SERPL-CCNC: 20 U/L (ref ?–34)
BASOPHILS # BLD AUTO: 0.05 X10(3) UL (ref 0–0.2)
BASOPHILS NFR BLD AUTO: 0.8 %
BILIRUB SERPL-MCNC: 0.5 MG/DL (ref 0.3–1.2)
BUN BLD-MCNC: 7 MG/DL (ref 9–23)
BUN/CREAT SERPL: 7.4 (ref 10–20)
CALCIUM BLD-MCNC: 9.5 MG/DL (ref 8.7–10.4)
CHLORIDE SERPL-SCNC: 104 MMOL/L (ref 98–112)
CO2 SERPL-SCNC: 27 MMOL/L (ref 21–32)
CREAT BLD-MCNC: 0.95 MG/DL
DEPRECATED RDW RBC AUTO: 37.5 FL (ref 35.1–46.3)
EGFRCR SERPLBLD CKD-EPI 2021: 116 ML/MIN/1.73M2 (ref 60–?)
EOSINOPHIL # BLD AUTO: 0.06 X10(3) UL (ref 0–0.7)
EOSINOPHIL NFR BLD AUTO: 0.9 %
ERYTHROCYTE [DISTWIDTH] IN BLOOD BY AUTOMATED COUNT: 12.2 % (ref 11–15)
GLOBULIN PLAS-MCNC: 2.3 G/DL (ref 2–3.5)
GLUCOSE BLD-MCNC: 93 MG/DL (ref 70–99)
HCT VFR BLD AUTO: 46.8 %
HGB BLD-MCNC: 16 G/DL
IMM GRANULOCYTES # BLD AUTO: 0.02 X10(3) UL (ref 0–1)
IMM GRANULOCYTES NFR BLD: 0.3 %
LYMPHOCYTES # BLD AUTO: 1.46 X10(3) UL (ref 1–4)
LYMPHOCYTES NFR BLD AUTO: 22.1 %
MCH RBC QN AUTO: 28.9 PG (ref 26–34)
MCHC RBC AUTO-ENTMCNC: 34.2 G/DL (ref 31–37)
MCV RBC AUTO: 84.5 FL
MONOCYTES # BLD AUTO: 0.64 X10(3) UL (ref 0.1–1)
MONOCYTES NFR BLD AUTO: 9.7 %
NEUTROPHILS # BLD AUTO: 4.37 X10 (3) UL (ref 1.5–7.7)
NEUTROPHILS # BLD AUTO: 4.37 X10(3) UL (ref 1.5–7.7)
NEUTROPHILS NFR BLD AUTO: 66.2 %
OSMOLALITY SERPL CALC.SUM OF ELEC: 288 MOSM/KG (ref 275–295)
PLATELET # BLD AUTO: 252 10(3)UL (ref 150–450)
POTASSIUM SERPL-SCNC: 3.6 MMOL/L (ref 3.5–5.1)
PROT SERPL-MCNC: 7 G/DL (ref 5.7–8.2)
RBC # BLD AUTO: 5.54 X10(6)UL
SODIUM SERPL-SCNC: 140 MMOL/L (ref 136–145)
TROPONIN I SERPL HS-MCNC: <3 NG/L
WBC # BLD AUTO: 6.6 X10(3) UL (ref 4–11)

## 2025-03-02 PROCEDURE — 84484 ASSAY OF TROPONIN QUANT: CPT | Performed by: EMERGENCY MEDICINE

## 2025-03-02 PROCEDURE — 93010 ELECTROCARDIOGRAM REPORT: CPT

## 2025-03-02 PROCEDURE — 71045 X-RAY EXAM CHEST 1 VIEW: CPT | Performed by: EMERGENCY MEDICINE

## 2025-03-02 PROCEDURE — 99285 EMERGENCY DEPT VISIT HI MDM: CPT

## 2025-03-02 PROCEDURE — 36415 COLL VENOUS BLD VENIPUNCTURE: CPT

## 2025-03-02 PROCEDURE — 85025 COMPLETE CBC W/AUTO DIFF WBC: CPT | Performed by: EMERGENCY MEDICINE

## 2025-03-02 PROCEDURE — 93005 ELECTROCARDIOGRAM TRACING: CPT

## 2025-03-02 PROCEDURE — 99284 EMERGENCY DEPT VISIT MOD MDM: CPT

## 2025-03-02 PROCEDURE — 80053 COMPREHEN METABOLIC PANEL: CPT | Performed by: EMERGENCY MEDICINE

## 2025-03-02 RX ORDER — SUCRALFATE 1 G/1
1 TABLET ORAL
Qty: 120 TABLET | Refills: 0 | Status: SHIPPED | OUTPATIENT
Start: 2025-03-02 | End: 2025-03-02

## 2025-03-02 RX ORDER — PANTOPRAZOLE SODIUM 40 MG/1
40 TABLET, DELAYED RELEASE ORAL DAILY
Qty: 30 TABLET | Refills: 0 | Status: SHIPPED | OUTPATIENT
Start: 2025-03-02 | End: 2025-03-02

## 2025-03-02 RX ORDER — PANTOPRAZOLE SODIUM 40 MG/1
40 TABLET, DELAYED RELEASE ORAL DAILY
Qty: 30 TABLET | Refills: 0 | Status: SHIPPED | OUTPATIENT
Start: 2025-03-02 | End: 2025-04-01

## 2025-03-02 RX ORDER — SUCRALFATE 1 G/1
1 TABLET ORAL
Qty: 120 TABLET | Refills: 0 | Status: SHIPPED | OUTPATIENT
Start: 2025-03-02 | End: 2025-03-05

## 2025-03-02 NOTE — ED PROVIDER NOTES
Patient Seen in: [facility] Emergency Department      History     Chief Complaint   Patient presents with    Chest Pain Angina    Difficulty Breathing     Stated Complaint: Chest Pain; SOB    Subjective:   HPI      24-year-old male presents for evaluation for chest pain, shortness of breath, back pain for the past several weeks.  Patient reports that has been seen here for the same.  He was started on omeprazole and had some dietary changes which did seem to help.  He denies any recent travel, prolonged immobilization, hemoptysis, pleuritic nature to the pain, nausea, vomiting, abdominal pain, leg pain or swelling, or injuries.  He does report sour taste in his mouth.    Objective:     History reviewed. No pertinent past medical history.           History reviewed. No pertinent surgical history.             Social History     Socioeconomic History    Marital status: Single   Tobacco Use    Smoking status: Former     Current packs/day: 0.00     Average packs/day: 0.1 packs/day for 3.0 years (0.2 ttl pk-yrs)     Types: Cigarettes     Start date: 2015     Quit date: 2018     Years since quittin.7    Smokeless tobacco: Never   Vaping Use    Vaping status: Former    Substances: Nicotine, THC   Substance and Sexual Activity    Alcohol use: Yes     Comment: Occasional - every 3 weeks    Drug use: Not Currently     Types: Cannabis    Sexual activity: Never     Social Drivers of Health      [facility]    Housing Stability                  Physical Exam     ED Triage Vitals [25 0812]   /79   Pulse 85   Resp 20   Temp 97.7 °F (36.5 °C)   Temp src Temporal   SpO2 100 %   O2 Device None (Room air)       Current Vitals:   Vital Signs  BP: 135/79  Pulse: 85  Resp: 20  Temp: 97.7 °F (36.5 °C)  Temp src: Temporal    Oxygen Therapy  SpO2: 100 %  O2 Device: None (Room air)        Physical Exam  Vitals and nursing note reviewed.    Constitutional:       Appearance: Normal appearance.   HENT:      Head: Normocephalic and atraumatic.   Cardiovascular:      Rate and Rhythm: Normal rate and regular rhythm.      Pulses: Normal pulses.           Radial pulses are 2+ on the right side and 2+ on the left side.      Heart sounds: Normal heart sounds.   Pulmonary:      Effort: Pulmonary effort is normal.      Breath sounds: Normal breath sounds.   Chest:      Chest wall: No tenderness.   Abdominal:      General: Bowel sounds are normal.      Palpations: Abdomen is soft.      Tenderness: There is no abdominal tenderness. There is no right CVA tenderness, left CVA tenderness, guarding or rebound.   Musculoskeletal:         General: Normal range of motion.      Cervical back: Normal range of motion.      Right lower leg: No edema.      Left lower leg: No edema.   Skin:     General: Skin is warm and dry.   Neurological:      General: No focal deficit present.      Mental Status: He is alert.             ED Course     Labs Reviewed   COMP METABOLIC PANEL (14) - Abnormal; Notable for the following components:       Result Value    BUN 7 (*)     BUN/CREA Ratio 7.4 (*)     All other components within normal limits   TROPONIN I HIGH SENSITIVITY - Normal   CBC WITH DIFFERENTIAL WITH PLATELET   RAINBOW DRAW LAVENDER   RAINBOW DRAW LIGHT GREEN   RAINBOW DRAW BLUE     EKG    Rate, intervals and axes as noted on EKG Report.  Rate: 78  Rhythm: Sinus Rhythm  Reading: no stemi, interpreted by myself.            ED Course as of 03/02/25 1447  ------------------------------------------------------------  Time: 03/02 1109  Value: XR CHEST AP PORTABLE  (CPT=71045)  Comment: Per my independent interpretation, patient's CXR demonstrates no PNA.                MDM        Heart Score:    HEART Score      Title      Criteria Score   Age: <45 Age Score: 0   History: Slightly Suspicious Hx Score: 0     EKG: Normal EKG Score: 0   HTN: No   Hypercholesterolemia: No    Atherosclerosis/PVD: No     DM: No   BMI>30kg/m2: No   Smoking: No   Family History: No         Other Risk Factor Score: 0             Lab Results   Component Value Date    TROPHS <3 03/02/2025           HEART Score: 0        Risk of adverse cardiac event is 0.9-1.7%                  Medical Decision Making  Differential diagnosis includes but is not limited to costochondritis, pneumonia, ACS, acid reflux, musculoskeletal pain, referred abdominal pain, etc.    Patient's abdominal exam was benign.  I do not suspect any intra-abdominal pathology.  CBC chemistry were unremarkable, troponin was normal and EKG without acute ischemic changes, ACS unlikely.  Heart score 0.  Patient's PERC negative, PE unlikely.  Symptoms do seem more consistent with reflux.  He will be started on a trial of Protonix and sucralfate and is advised to use GoodRx as he does not have insurance.  Return precautions given.  Follow-up encouraged.    Rhythm Strip: Rate 88 sinus rhythm as interpreted by myself. The cardiac monitor was ordered secondary to the patient's chest pain.       Medical Record Review: I personally reviewed available prior medical records for any recent pertinent discharge summaries, testing, and procedures, and reviewed those reports.    Complicating factors: The patient  has no past medical history on file. and  has no past surgical history on file. that contribute to the medical complexity of this ED evaluation.     Clinical impression as well as any lab results and radiology findings were discussed with the patient and/or caregiver. I personally reviewed all laboratory results and radiology images myself. Patient is advised to follow up with PCP for reevaluation. I provided discharge instructions and return precautions. Patient and/or caregiver voices understanding and agreement with the treatment plan. All questions were addressed and answered.         Problems Addressed:  Acute chest pain: acute illness or injury with  systemic symptoms    Amount and/or Complexity of Data Reviewed  External Data Reviewed: ECG.     Details: EKG from 12/20/24 reviewed, NSR rate 67  Labs: ordered. Decision-making details documented in ED Course.  Radiology: ordered and independent interpretation performed. Decision-making details documented in ED Course.  ECG/medicine tests: ordered and independent interpretation performed. Decision-making details documented in ED Course.    Risk  Prescription drug management.        Disposition and Plan     Clinical Impression:  1. Acute chest pain         Disposition:  Discharge  3/2/2025 11:09 am    Follow-up:  Iam Knott MD  [address] [place] [address]  [phone]    Follow up  Primary care follow up if you don't have a PCP          Medications Prescribed:  Discharge Medication List as of 3/2/2025 11:15 AM        START taking these medications    Details   pantoprazole 40 MG Oral Tab EC Take 1 tablet (40 mg total) by mouth daily., Print, Disp-30 tablet, R-0      sucralfate 1 g Oral Tab Take 1 tablet (1 g total) by mouth 4 (four) times daily before meals and nightly., Print, Disp-120 tablet, R-0                 Supplementary Documentation:

## 2025-03-02 NOTE — ED QUICK NOTES
Patient is crying during triage. Patient states:\"I am tired to be sick.\" Patient states not having insurance.

## 2025-03-02 NOTE — ED INITIAL ASSESSMENT (HPI)
Patient is here with intermittent chest pain, shortness of breath & upper back pain x 2 months. Patient also states that he constantly smells & tastes cologne, cleaning products.   Patient denies lifting anything heavy or doing strenuous exercises.

## 2025-03-02 NOTE — DISCHARGE INSTRUCTIONS
You can use the mobile [employer] or website [email or web address] for coupons for your medications at different pharmacies.

## 2025-03-03 ENCOUNTER — HOSPITAL ENCOUNTER (EMERGENCY)
Facility: HOSPITAL | Age: 25
Discharge: HOME | End: 2025-03-03
Attending: EMERGENCY MEDICINE

## 2025-03-03 VITALS
HEART RATE: 84 BPM | WEIGHT: 175 LBS | HEIGHT: 70 IN | BODY MASS INDEX: 25.05 KG/M2 | RESPIRATION RATE: 18 BRPM | SYSTOLIC BLOOD PRESSURE: 135 MMHG | DIASTOLIC BLOOD PRESSURE: 76 MMHG | OXYGEN SATURATION: 100 % | TEMPERATURE: 99 F

## 2025-03-03 DIAGNOSIS — Z77.120 MOLD EXPOSURE: Primary | ICD-10-CM

## 2025-03-03 LAB
ATRIAL RATE: 78 BPM
ATRIAL RATE: 78 BPM
P AXIS: 65 DEGREES
P AXIS: 75 DEGREES
P-R INTERVAL: 142 MS
P-R INTERVAL: 150 MS
Q-T INTERVAL: 356 MS
Q-T INTERVAL: 356 MS
QRS DURATION: 100 MS
QRS DURATION: 110 MS
QTC CALCULATION (BEZET): 405 MS
QTC CALCULATION (BEZET): 405 MS
R AXIS: -8 DEGREES
R AXIS: 2 DEGREES
T AXIS: 56 DEGREES
T AXIS: 67 DEGREES
VENTRICULAR RATE: 78 BPM
VENTRICULAR RATE: 78 BPM

## 2025-03-03 PROCEDURE — 93005 ELECTROCARDIOGRAM TRACING: CPT

## 2025-03-03 PROCEDURE — 93010 ELECTROCARDIOGRAM REPORT: CPT

## 2025-03-03 PROCEDURE — 99283 EMERGENCY DEPT VISIT LOW MDM: CPT

## 2025-03-03 NOTE — ED PROVIDER NOTES
Patient Seen in: [facility] Emergency Department      History     Chief Complaint   Patient presents with    Chest Pain Angina     Stated Complaint: chest pain    Subjective:   HPI    Pt is 23 yo M who p/w continued chest burning for last few months. Takes omeprazole. Occasionally gets short of breath. Was seen in ED earlier today and had negative workup. States he also has chemical smell in his nostrils and now thinks his symptoms are related to mold exposure in his building but does not have a doctor for follow up.     Objective:     No pertinent past medical history.            No pertinent past surgical history.              No pertinent social history.                Physical Exam     ED Triage Vitals [03/03/25 0040]   /76   Pulse 84   Resp 18   Temp 98.8 °F (37.1 °C)   Temp src Temporal   SpO2 100 %   O2 Device        Current Vitals:   Vital Signs  BP: 135/76  Pulse: 84  Resp: 18  Temp: 98.8 °F (37.1 °C)  Temp src: Temporal    Oxygen Therapy  SpO2: 100 %        Physical Exam  GENERAL: No acute distress, awake and alert  HEENT: EOMI, PERRL  Neck: supple  CV: RRR, no murmurs  Resp: CTAB, no wheezes or retractions  Extremities: FROM of all extremities  Neuro: CN intact, normal speech, normal gait, 5/5 motor strength in all extremities, no focal deficits  SKIN: warm, dry, no rashes      ED Course   Labs Reviewed - No data to display  EKG    Rate, intervals and axes as noted on EKG Report.  Rate: 78  Rhythm: Sinus Rhythm  Reading: no OLU, normal         MDM      Medical Decision Making  Pt states symptoms are unchanged since this morning. Vitals stable. Low risk for other sources of symptoms. We did discuss removal from mold situation and f/u for further testing and/or treatment if indicated.         Disposition and Plan     Clinical Impression:  1. Mold exposure         Disposition:  Discharge  3/3/2025  1:06 am    Follow-up:  Dejon Hansen MD  [address]  [address]  [facility]  [id number]  [phone]    Follow up            Medications Prescribed:  Current Discharge Medication List              Supplementary Documentation:

## 2025-03-03 NOTE — ED INITIAL ASSESSMENT (HPI)
States that he can't smell because he was exposed to black mold. C/O burning in his chest. No C/O shortness of breath

## 2025-03-04 NOTE — PROGRESS NOTES
FAMILY MEDICINE CLINIC NOTE    HPI  Tong Quan is a 24 year old male presenting for     #Congestion  #Phlegm  -seen in ER 3/2/25, 3/3/25  -EKG 3/3/25 normal  -CXR 3/2/25 normal  -reports ongoing for 6 months   -notes difficulty breathing especially after showering and lying back  -sharp pains in upper chest  -no recent exercise  -no trauma or injury  -reports feels like he has phlegm to cough up all the time   -chills every night   -chest pain - intermittently, lasting 5 seconds  -no coughing  -notes watery eyes  -notes history of mold exposure  -does have a new cat for past few months-1 year wilver  -moved away from dad's house - symptoms with some improvement   -feels like he is having difficulty breathing   -denies nasal dripping  -did not start taking pantoprazole and sucrulfate prescribed in ER  -reports taking benadryl - this is helpful     #Loose stools  -everyday  -formed stool in the beginning, gets looser throughout the day  -notes has bowel movements 4 times a day  -feels constipated at times   -no abd pain  -no blood in the stool   -H pylori negative   3/2025  -loose stools once a day or every other day now  -not watery  -advised seeing GI  -no insurance     #Anxiety  #Depression  -has had anxiety for many years  -no depression   -GAD7 score of 10, very difficult   -PHQ9 score of 8, somewhat difficult  -no SI/HI  -no AVH  -no significant trauma in life  -denies symptoms of maryam   3/2025  -still ongoing anxiety and depression  -no insurance, can't see a therapist  -open to medication     #HA----resolved  -reports right sided  -reports intermittent  -every other day, 1-2 times per day when occuring  -headaches will last for 4-5 minutes and goes away by itself  -ongoing for 1 month  -no fever/chills  -no N/V  -photophobia - lights hurt eyes  -no issues with loud sounds  -not taking any for pain  -occasional tearing but not necessarily related to headaches - he is insure  3/2025---now very infrequent, no  further issues     #muscle twitching---resolved  -reports muscle twitching all around his body  -feels like he is getting pinched  -lasts a couple seconds   -everyday  -no significant pain with   -not taking anything for this   3/2025  -reports resolved         ROS  GENERAL: No fever/chills, no recent weight loss  HEENT: No visual changes, no changes in hearing, no sore throats  NECK: No pain, no swelling  RESP: No cough, no SOB  CV: +chest congestion, chest discomfort, no palpitations  GI: No abd pain, no N/V/D  MSK: No edema  SKIN: No new rashes  NEURO: No numbness, no tingling, no headaches    HEALTH MAINTENANCE  Health Maintenance Topics with due status: Overdue       Topic Date Due    Annual Physical Never done    HPV Vaccines Never done    Meningococcal B Vaccine Never done    DTaP,Tdap,and Td Vaccines Never done    COVID-19 Vaccine Never done    Influenza Vaccine Never done    Annual Depression Screening 01/01/2025       ALLERGIES  Allergies[1]    MEDICATIONS  Current Outpatient Medications   Medication Sig Dispense Refill    escitalopram 5 MG Oral Tab Take 1 tablet (5 mg total) by mouth daily. 90 tablet 3    cetirizine 10 MG Oral Tab Take 1 tablet (10 mg total) by mouth daily. 90 tablet 0    pantoprazole 40 MG Oral Tab EC Take 1 tablet (40 mg total) by mouth daily. 30 tablet 0       ACTIVE PROBLEMS  Patient Active Problem List   Diagnosis    Loose stools    Hemorrhoids    Generalized anxiety disorder    Current mild episode of major depressive disorder    Chest congestion    Chest pain       PAST MEDICAL HISTORY  History reviewed. No pertinent past medical history.    PAST SOCIAL HISTORY  Social History     Socioeconomic History    Marital status: Single     Spouse name: Not on file    Number of children: Not on file    Years of education: Not on file    Highest education level: Not on file   Occupational History    Not on file   Tobacco Use    Smoking status: Former     Current packs/day: 0.00     Average  packs/day: 0.1 packs/day for 3.0 years (0.2 ttl pk-yrs)     Types: Cigarettes     Start date: 2015     Quit date: 2018     Years since quittin.7    Smokeless tobacco: Never   Vaping Use    Vaping status: Former    Substances: Nicotine, THC   Substance and Sexual Activity    Alcohol use: Yes     Comment: Occasional - every 3 weeks    Drug use: Not Currently     Types: Cannabis    Sexual activity: Never   Other Topics Concern    Caffeine Concern Not Asked    Exercise Not Asked    Seat Belt Not Asked    Special Diet Not Asked    Stress Concern Not Asked    Weight Concern Not Asked   Social History Narrative    Not on file     Social Drivers of Health     Food Insecurity: Not on file   Transportation Needs: Not on file   Stress: Not on file   Housing Stability: Low Risk  (7/10/2021)    [facility]    Housing Stability     Mortgage Payment Concerns?: Not on file     Number of Places Lived in the Last Year: Not on file     Unstable Housing?: Not on file       PAST SURGICAL HISTORY  History reviewed. No pertinent surgical history.    PAST FAMILY HISTORY  Family History   Problem Relation Age of Onset    No Known Problems Mother     Diabetes Father     Hypertension Father     Seizure Disorder Sister     No Known Problems Sister     No Known Problems Sister     No Known Problems Sister     No Known Problems Brother     No Known Problems Maternal Grandmother     No Known Problems Maternal Grandfather     No Known Problems Paternal Grandmother     Heart Attack Paternal Grandfather     Colon Cancer Neg     Prostate Cancer Neg     Ulcerative Colitis Neg     Crohn's Disease Neg          PHYSICAL EXAM  Vitals:    25 1052   BP: 120/74   Pulse: 80   Temp: 97.6 °F (36.4 °C)   SpO2: 98%   Weight: 165 lb (74.8 kg)   Height: 5' 10\" (1.778 m)      Body mass index is 23.68 kg/m².    GENERAL: Slightly anxious appearing.  Frequently try to clear his throat  throughout the visit.  HEENT: Moist mucous membranes, no tonsillar swelling or erythema, PERRLA bilat, TM translucent and non-bulging  NECK: Supple, non-tender  RESP: Non-labored respirations, CTAB, no wheezing, no rales, no rhonchi  CV: RRR, no murmurs  MSK: No edema.  No chest wall tenderness palpation.  NEURO: Answering questions appropriately    LABS  Lab Results   Component Value Date    WBC 6.6 03/02/2025    HGB 16.0 03/02/2025    HCT 46.8 03/02/2025    .0 03/02/2025    NEPERCENT 66.2 03/02/2025    LYPERCENT 22.1 03/02/2025    MOPERCENT 9.7 03/02/2025    EOPERCENT 0.9 03/02/2025    BAPERCENT 0.8 03/02/2025    NE 4.37 03/02/2025    LYMABS 1.46 03/02/2025    MOABSO 0.64 03/02/2025    EOABSO 0.06 03/02/2025    BAABSO 0.05 03/02/2025       Lab Results   Component Value Date     03/02/2025    K 3.6 03/02/2025     03/02/2025    CO2 27.0 03/02/2025    ANIONGAP 9 03/02/2025    BUN 7 (L) 03/02/2025    CREATSERUM 0.95 03/02/2025    BUNCREA 7.4 (L) 03/02/2025    GLU 93 03/02/2025    CA 9.5 03/02/2025    OSMOCALC 288 03/02/2025    GFRAA  05/03/2019      Comment:      Unable to calculate eGFR due to missing height. If height is known click \"eGFR Calculator\" link below to calculate eGFR.      ALT 35 03/02/2025    AST 20 03/02/2025    ALKPHO 99 03/02/2025    BILT 0.5 03/02/2025    TP 7.0 03/02/2025    ALB 4.7 03/02/2025    GLOBULIN 2.3 03/02/2025    ELECTAG 2.0 03/02/2025    FASTING Yes 10/12/2023    FASTING Yes 10/12/2023         Lab Results   Component Value Date    CHOLEST 147 10/12/2023    TRIG 46 10/12/2023    HDL 60 (H) 10/12/2023    LDL 77 10/12/2023    VLDL 7 10/12/2023    NONHDLC 87 10/12/2023        DIAGNOSTICS      ASSESSMENT/PLAN  Problem List Items Addressed This Visit          HCC Problems    Current mild episode of major depressive disorder     Patient with generalized anxiety disorder as well as mild major depressive disorder  Discussed options for management including therapy and  medication treatment.  He is unable to pursue therapy at this time due to not having insurance.  He is interested in starting medication.   Start escitalopram 5 mg daily   Follow up in 1 month if able.          Relevant Medications    escitalopram 5 MG Oral Tab       Cardiac and Vasculature    Chest pain     Patient with intermittent very short lasting chest discomfort.  He is nontoxic-appearing in the office  He is currently without pain.  He was already seen in the ER and had a normal EKG and chest x-ray and labs.  He reports that the discomfort lasts only a couple seconds and goes away on its own  Discussed that he can use Tylenol as needed for pain  Hopefully with improvement in his chest congestion and not having to clear his throat he will have improvements in his chest discomfort moving forward.            Neuro    RESOLVED: Muscle twitching     Resolved         Relevant Medications    escitalopram 5 MG Oral Tab    RESOLVED: Other headache syndrome     Reports resolved.          Relevant Medications    escitalopram 5 MG Oral Tab       Mental Health    Generalized anxiety disorder     Patient with generalized anxiety disorder as well as mild major depressive disorder  Discussed options for management including therapy and medication treatment.  He is unable to pursue therapy at this time due to not having insurance.  He is interested in starting medication.   Start escitalopram 5 mg daily   Follow up in 1 month if able.          Relevant Medications    escitalopram 5 MG Oral Tab       Gastrointestinal and Abdominal    Loose stools     Patient reports persistent loose stools.  Does have a component of constipation.  Previously discussed hydration, fiber intake and regular exercise.  Previously discussed citrucel on a regular basis for proper fiber intake.  Previously discussed keeping a food log  Previously discussed FODMAP diet.  Some of this may be irritable bowel syndrome related.  Would benefit from getting  anxiety and depression are better controlled.  Will check stool culture.   Would like to refer to GI however he has no insurance. If he gets insurance I discussed with him that he can notify me for a referral.          Relevant Orders    Stool Culture w/Shonda [E]       Symptoms and Signs    Chest congestion - Primary     Patient with persistent chest congestion and phlegm production ongoing for several months.  He reports that he is short of breath, however he is nontoxic in the office, without labored breathing and with excellent excellent oxygen saturation.  I suspect that some of his symptoms may be related to possible allergies  He notes that taking Benadryl seems to help with sinus symptoms  Start cetirizine 10 mg daily.  Can also use fluticasone nasal spray as needed.  Advised to look out for exposures to allergens  Over the last year he got a new cat-this may be a possible trigger.  Can also consider mold exposure.  Avoid exposures  Can also consider acid reflux.  Advised trial of pantoprazole.  If without gradual improvement, advise close [id number] pulmonary function testing moving forward.  Follow-up as needed.         Relevant Medications    escitalopram 5 MG Oral Tab    cetirizine 10 MG Oral Tab       Return in about 1 month (around 2025) for follow up if able.    This is a Header Test   Topic Date Due    Annual Physical  Never done        Judah Fabian MD  Family Medicine           Pre-chartin minutes  Reviewing/obtaining: 10 minutes  Medical Exam:1 minutes  Counseling/education: 5 minutes  Notes: 5 minutes  Referring/communicatin minutes  Care coordination: 0 minutes    My total time spent caring for the patient on the day of the encounter: 23 minutes         [1] No Known Allergies

## 2025-03-05 ENCOUNTER — OFFICE VISIT (OUTPATIENT)
Dept: INTERNAL MEDICINE CLINIC | Facility: CLINIC | Age: 25
End: 2025-03-05

## 2025-03-05 VITALS
HEART RATE: 80 BPM | WEIGHT: 165 LBS | OXYGEN SATURATION: 98 % | BODY MASS INDEX: 23.62 KG/M2 | SYSTOLIC BLOOD PRESSURE: 120 MMHG | DIASTOLIC BLOOD PRESSURE: 74 MMHG | HEIGHT: 70 IN | TEMPERATURE: 98 F

## 2025-03-05 DIAGNOSIS — R25.3 MUSCLE TWITCHING: ICD-10-CM

## 2025-03-05 DIAGNOSIS — F32.0 CURRENT MILD EPISODE OF MAJOR DEPRESSIVE DISORDER WITHOUT PRIOR EPISODE: ICD-10-CM

## 2025-03-05 DIAGNOSIS — R19.5 LOOSE STOOLS: ICD-10-CM

## 2025-03-05 DIAGNOSIS — G44.89 OTHER HEADACHE SYNDROME: ICD-10-CM

## 2025-03-05 DIAGNOSIS — R09.89 CHEST CONGESTION: Primary | ICD-10-CM

## 2025-03-05 DIAGNOSIS — R07.9 CHEST PAIN, UNSPECIFIED TYPE: ICD-10-CM

## 2025-03-05 DIAGNOSIS — F41.1 GENERALIZED ANXIETY DISORDER: ICD-10-CM

## 2025-03-05 PROCEDURE — 99214 OFFICE O/P EST MOD 30 MIN: CPT | Performed by: FAMILY MEDICINE

## 2025-03-05 RX ORDER — CETIRIZINE HYDROCHLORIDE 10 MG/1
10 TABLET ORAL DAILY
Qty: 90 TABLET | Refills: 0 | Status: SHIPPED | OUTPATIENT
Start: 2025-03-05

## 2025-03-05 RX ORDER — ESCITALOPRAM OXALATE 5 MG/1
5 TABLET ORAL DAILY
Qty: 90 TABLET | Refills: 3 | Status: SHIPPED | OUTPATIENT
Start: 2025-03-05

## 2025-03-05 NOTE — ASSESSMENT & PLAN NOTE
Patient with generalized anxiety disorder as well as mild major depressive disorder  Discussed options for management including therapy and medication treatment.  He is unable to pursue therapy at this time due to not having insurance.  He is interested in starting medication.   Start escitalopram 5 mg daily   Follow up in 1 month if able.

## 2025-03-05 NOTE — ASSESSMENT & PLAN NOTE
Patient with persistent chest congestion and phlegm production ongoing for several months.  He reports that he is short of breath, however he is nontoxic in the office, without labored breathing and with excellent excellent oxygen saturation.  I suspect that some of his symptoms may be related to possible allergies  He notes that taking Benadryl seems to help with sinus symptoms  Start cetirizine 10 mg daily.  Can also use fluticasone nasal spray as needed.  Advised to look out for exposures to allergens  Over the last year he got a new cat-this may be a possible trigger.  Can also consider mold exposure.  Avoid exposures  Can also consider acid reflux.  Advised trial of pantoprazole.  If without gradual improvement, advise close [id number] pulmonary function testing moving forward.  Follow-up as needed.

## 2025-03-05 NOTE — ASSESSMENT & PLAN NOTE
Patient with intermittent very short lasting chest discomfort.  He is nontoxic-appearing in the office  He is currently without pain.  He was already seen in the ER and had a normal EKG and chest x-ray and labs.  He reports that the discomfort lasts only a couple seconds and goes away on its own  Discussed that he can use Tylenol as needed for pain  Hopefully with improvement in his chest congestion and not having to clear his throat he will have improvements in his chest discomfort moving forward.

## 2025-03-05 NOTE — PATIENT INSTRUCTIONS
PATIENT INSTRUCTIONS    Thank you for seeing me today, it was a pleasure taking care of you.  Please check out at the  and schedule a follow up appointment.  Return in about 1 month (around 4/5/2025) for follow up if able.  Please remember that the preferred tho period for appointments is 5 minutes. This is to help maximize the amount of time that we can spend together at our visits.    Start pantoprazole (antacid) prescribed in ER  Start cetirizine (allergy)  If with runny nose can use fluticasone nasal spray - otc  Start escitalopram 5 mg daily (anxiety and depression)  When with insurance, notify me and I would like for you to see GI   Get stool studies done - need to provide a sample at hospital     Best,  Dr. Fabian

## 2025-03-05 NOTE — ASSESSMENT & PLAN NOTE
Patient reports persistent loose stools.  Does have a component of constipation.  Previously discussed hydration, fiber intake and regular exercise.  Previously discussed citrucel on a regular basis for proper fiber intake.  Previously discussed keeping a food log  Previously discussed FODMAP diet.  Some of this may be irritable bowel syndrome related.  Would benefit from getting anxiety and depression are better controlled.  Will check stool culture.   Would like to refer to GI however he has no insurance. If he gets insurance I discussed with him that he can notify me for a referral.

## 2025-06-07 ENCOUNTER — PATIENT MESSAGE (OUTPATIENT)
Dept: INTERNAL MEDICINE CLINIC | Facility: CLINIC | Age: 25
End: 2025-06-07

## 2025-06-07 PROCEDURE — 96374 THER/PROPH/DIAG INJ IV PUSH: CPT

## 2025-06-07 PROCEDURE — 93005 ELECTROCARDIOGRAM TRACING: CPT

## 2025-06-07 PROCEDURE — 99285 EMERGENCY DEPT VISIT HI MDM: CPT

## 2025-06-07 PROCEDURE — 99284 EMERGENCY DEPT VISIT MOD MDM: CPT

## 2025-06-07 PROCEDURE — 93010 ELECTROCARDIOGRAM REPORT: CPT

## 2025-06-08 ENCOUNTER — APPOINTMENT (OUTPATIENT)
Dept: GENERAL RADIOLOGY | Facility: HOSPITAL | Age: 25
End: 2025-06-08
Attending: NURSE PRACTITIONER

## 2025-06-08 ENCOUNTER — HOSPITAL ENCOUNTER (EMERGENCY)
Facility: HOSPITAL | Age: 25
Discharge: HOME | End: 2025-06-08

## 2025-06-08 VITALS
WEIGHT: 172 LBS | HEART RATE: 83 BPM | RESPIRATION RATE: 16 BRPM | HEIGHT: 70 IN | OXYGEN SATURATION: 98 % | BODY MASS INDEX: 24.62 KG/M2 | TEMPERATURE: 99 F | DIASTOLIC BLOOD PRESSURE: 96 MMHG | SYSTOLIC BLOOD PRESSURE: 146 MMHG

## 2025-06-08 DIAGNOSIS — R10.13 EPIGASTRIC PAIN: Primary | ICD-10-CM

## 2025-06-08 LAB
ANION GAP SERPL CALC-SCNC: 11 MMOL/L (ref 0–18)
BASOPHILS # BLD AUTO: 0.03 X10(3) UL (ref 0–0.2)
BASOPHILS NFR BLD AUTO: 0.5 %
BUN BLD-MCNC: 10 MG/DL (ref 9–23)
BUN/CREAT SERPL: 8.7 (ref 10–20)
CALCIUM BLD-MCNC: 9.8 MG/DL (ref 8.7–10.4)
CHLORIDE SERPL-SCNC: 102 MMOL/L (ref 98–112)
CO2 SERPL-SCNC: 28 MMOL/L (ref 21–32)
CREAT BLD-MCNC: 1.15 MG/DL (ref 0.7–1.3)
DEPRECATED RDW RBC AUTO: 38.1 FL (ref 35.1–46.3)
EGFRCR SERPLBLD CKD-EPI 2021: 92 ML/MIN/1.73M2 (ref 60–?)
EOSINOPHIL # BLD AUTO: 0.04 X10(3) UL (ref 0–0.7)
EOSINOPHIL NFR BLD AUTO: 0.7 %
ERYTHROCYTE [DISTWIDTH] IN BLOOD BY AUTOMATED COUNT: 12.3 % (ref 11–15)
GLUCOSE BLD-MCNC: 104 MG/DL (ref 70–99)
HCT VFR BLD AUTO: 48 % (ref 39–53)
HGB BLD-MCNC: 16.4 G/DL (ref 13–17.5)
IMM GRANULOCYTES # BLD AUTO: 0.01 X10(3) UL (ref 0–1)
IMM GRANULOCYTES NFR BLD: 0.2 %
LYMPHOCYTES # BLD AUTO: 1.43 X10(3) UL (ref 1–4)
LYMPHOCYTES NFR BLD AUTO: 23.8 %
MCH RBC QN AUTO: 29.3 PG (ref 26–34)
MCHC RBC AUTO-ENTMCNC: 34.2 G/DL (ref 31–37)
MCV RBC AUTO: 85.7 FL (ref 80–100)
MONOCYTES # BLD AUTO: 0.46 X10(3) UL (ref 0.1–1)
MONOCYTES NFR BLD AUTO: 7.7 %
NEUTROPHILS # BLD AUTO: 4.03 X10 (3) UL (ref 1.5–7.7)
NEUTROPHILS # BLD AUTO: 4.03 X10(3) UL (ref 1.5–7.7)
NEUTROPHILS NFR BLD AUTO: 67.1 %
OSMOLALITY SERPL CALC.SUM OF ELEC: 291 MOSM/KG (ref 275–295)
PLATELET # BLD AUTO: 240 10(3)UL (ref 150–450)
POTASSIUM SERPL-SCNC: 3.7 MMOL/L (ref 3.5–5.1)
RBC # BLD AUTO: 5.6 X10(6)UL (ref 4.3–5.7)
SODIUM SERPL-SCNC: 141 MMOL/L (ref 136–145)
TROPONIN I SERPL HS-MCNC: <3 NG/L (ref ?–53)
WBC # BLD AUTO: 6 X10(3) UL (ref 4–11)

## 2025-06-08 PROCEDURE — 71045 X-RAY EXAM CHEST 1 VIEW: CPT | Performed by: NURSE PRACTITIONER

## 2025-06-08 PROCEDURE — 84484 ASSAY OF TROPONIN QUANT: CPT | Performed by: NURSE PRACTITIONER

## 2025-06-08 PROCEDURE — 80048 BASIC METABOLIC PNL TOTAL CA: CPT | Performed by: NURSE PRACTITIONER

## 2025-06-08 PROCEDURE — 85025 COMPLETE CBC W/AUTO DIFF WBC: CPT | Performed by: NURSE PRACTITIONER

## 2025-06-08 RX ORDER — PANTOPRAZOLE SODIUM 20 MG/1
20 TABLET, DELAYED RELEASE ORAL DAILY
Qty: 30 TABLET | Refills: 0 | Status: SHIPPED | OUTPATIENT
Start: 2025-06-08 | End: 2025-07-08

## 2025-06-08 NOTE — ED INITIAL ASSESSMENT (HPI)
Pt with Hx of GERD to ED with c/o burning sensation to epigastric region and SOB x1 month. Pt stopped taking his pantoprazole x2 months ago.

## 2025-06-08 NOTE — ED PROVIDER NOTES
Patient Seen in: [facility] Emergency Department        History  Chief Complaint   Patient presents with    Gastro-esophageal Reflux     Stated Complaint: GERD    Subjective:   25yo/m w no chronic medical problems reports to the ED w epigastric pain and chest pain/dyspnea x 1 mo. Burning. Hx of the same x 1 year dx with gERD and improvement with ppi. No hemoptysis. No cough. No flank pain. No urinary symptoms. No chest pain. No dizziness.                       Objective:     No pertinent past medical history.            No pertinent past surgical history.              No pertinent social history.                              Physical Exam    ED Triage Vitals [06/07/25 2358]   /90   Pulse 92   Resp 20   Temp 98.6 °F (37 °C)   Temp src    SpO2 97 %   O2 Device None (Room air)       Current Vitals:   Vital Signs  BP: 135/90  Pulse: 92  Resp: 20  Temp: 98.6 °F (37 °C)    Oxygen Therapy  SpO2: 97 %  O2 Device: None (Room air)            Physical Exam  Vitals and nursing note reviewed.   Constitutional:       General: He is not in acute distress.     Appearance: He is well-developed.   HENT:      Head: Normocephalic and atraumatic.      Nose: Nose normal.      Mouth/Throat:      Mouth: Mucous membranes are moist.   Eyes:      Conjunctiva/sclera: Conjunctivae normal.      Pupils: Pupils are equal, round, and reactive to light.   Cardiovascular:      Rate and Rhythm: Normal rate and regular rhythm.      Heart sounds: Normal heart sounds.   Pulmonary:      Effort: Pulmonary effort is normal.      Breath sounds: Normal breath sounds.   Abdominal:      General: Bowel sounds are normal.      Palpations: Abdomen is soft.   Musculoskeletal:         General: No tenderness or deformity. Normal range of motion.      Cervical back: Normal range of motion and neck supple.   Skin:     General: Skin is warm and dry.      Capillary Refill: Capillary refill takes less than 2 seconds.      Findings: No rash.      Comments:  Normal color   Neurological:      General: No focal deficit present.      Mental Status: He is alert and oriented to person, place, and time.      GCS: GCS eye subscore is 4. GCS verbal subscore is 5. GCS motor subscore is 6.      Cranial Nerves: No cranial nerve deficit.      Gait: Gait normal.                 ED Course  Labs Reviewed   BASIC METABOLIC PANEL (8) - Abnormal; Notable for the following components:       Result Value    Glucose 104 (*)     BUN/CREA Ratio 8.7 (*)     All other components within normal limits   TROPONIN I HIGH SENSITIVITY - Normal   CBC WITH DIFFERENTIAL WITH PLATELET     EKG    Rate, intervals and axes as noted on EKG Report.  Rate: 80   Rhythm: Sinus Rhythm  Reading: NSR no maddi no ectopy normal axis  Stable clinical condition  No comparison                               MDM             Medical Decision Making  25yo/m w hx and exam as stated; epigastric/chest pain    Hx of GERD, same symptoms per patient  Has no insurance unable to f/u with pcp or be see by GI  Was on ppi w/o issue  PERC neg  Heart score 1  Lungs ctab  No weakness  No hemoptysis  Cxr wnl  Trop neg  EKG non acute    Plan  Dc to home  Protonix  Gi fu      Amount and/or Complexity of Data Reviewed  Labs:  Decision-making details documented in ED Course.  Radiology:  Decision-making details documented in ED Course.  ECG/medicine tests:  Decision-making details documented in ED Course.    Risk  OTC drugs.  Prescription drug management.        Disposition and Plan     Clinical Impression:  1. Epigastric pain         Disposition:  Discharge  6/8/2025 12:55 am    Follow-up:  Ishmael Zhang MD  [address]  [facility] [address]  [phone]    Follow up in 2 day(s)            Medications Prescribed:  Current Discharge Medication List                Supplementary Documentation:

## 2025-06-09 LAB
ATRIAL RATE: 80 BPM
P AXIS: 63 DEGREES
P-R INTERVAL: 140 MS
Q-T INTERVAL: 356 MS
QRS DURATION: 110 MS
QTC CALCULATION (BEZET): 410 MS
R AXIS: -12 DEGREES
T AXIS: 47 DEGREES
VENTRICULAR RATE: 80 BPM

## (undated) NOTE — ED AVS SNAPSHOT
Jose Rojas   MRN: [id number]    Department:  [facility] Emergency Department   Date of Visit:  5/3/2019           Disclosure     Insurance plans vary and the physician(s) referred by the ER may not be covered by your plan.  Please contact your CARE PHYSICIAN AT ONCE OR RETURN IMMEDIATELY TO THE EMERGENCY DEPARTMENT. If you have been prescribed any medication(s), please fill your prescription right away and begin taking the medication(s) as directed.   If you believe that any of the medications

## (undated) NOTE — LETTER
10/12/2023              [facility]        [address] [address]         To whom it may concern,    Carmelita Medrano had to attend her son's medical visit today. She will be late for work.       Sincerely,       ZEINA Parra  [facility], [place], [place]  [address]  [address] [address] [id number] [phone]        Document electronically generated by:  ZEINA Parra